# Patient Record
Sex: FEMALE | Race: WHITE | Employment: OTHER | URBAN - METROPOLITAN AREA
[De-identification: names, ages, dates, MRNs, and addresses within clinical notes are randomized per-mention and may not be internally consistent; named-entity substitution may affect disease eponyms.]

---

## 2023-03-28 ENCOUNTER — OFFICE VISIT (OUTPATIENT)
Dept: OBGYN CLINIC | Age: 78
End: 2023-03-28
Payer: MEDICARE

## 2023-03-28 VITALS
SYSTOLIC BLOOD PRESSURE: 120 MMHG | HEIGHT: 64 IN | WEIGHT: 141.6 LBS | BODY MASS INDEX: 24.17 KG/M2 | DIASTOLIC BLOOD PRESSURE: 80 MMHG

## 2023-03-28 DIAGNOSIS — N81.4 UTERINE PROLAPSE: Primary | ICD-10-CM

## 2023-03-28 DIAGNOSIS — Z00.00 MEDICARE ANNUAL WELLNESS VISIT, INITIAL: ICD-10-CM

## 2023-03-28 PROCEDURE — G8420 CALC BMI NORM PARAMETERS: HCPCS | Performed by: OBSTETRICS & GYNECOLOGY

## 2023-03-28 PROCEDURE — G0101 CA SCREEN;PELVIC/BREAST EXAM: HCPCS | Performed by: OBSTETRICS & GYNECOLOGY

## 2023-03-28 PROCEDURE — G8427 DOCREV CUR MEDS BY ELIG CLIN: HCPCS | Performed by: OBSTETRICS & GYNECOLOGY

## 2023-03-28 RX ORDER — MULTIVITAMIN
1 TABLET ORAL DAILY
COMMUNITY
Start: 2013-04-25

## 2023-03-28 RX ORDER — TRAZODONE HYDROCHLORIDE 50 MG/1
50 TABLET ORAL NIGHTLY
COMMUNITY
Start: 2023-03-21

## 2023-03-28 RX ORDER — FEXOFENADINE HCL 180 MG/1
180 TABLET ORAL DAILY PRN
COMMUNITY

## 2023-03-28 ASSESSMENT — PATIENT HEALTH QUESTIONNAIRE - PHQ9
SUM OF ALL RESPONSES TO PHQ9 QUESTIONS 1 & 2: 0
SUM OF ALL RESPONSES TO PHQ QUESTIONS 1-9: 0
SUM OF ALL RESPONSES TO PHQ QUESTIONS 1-9: 0
1. LITTLE INTEREST OR PLEASURE IN DOING THINGS: 0
SUM OF ALL RESPONSES TO PHQ QUESTIONS 1-9: 0
SUM OF ALL RESPONSES TO PHQ QUESTIONS 1-9: 0
2. FEELING DOWN, DEPRESSED OR HOPELESS: 0

## 2023-03-28 NOTE — PROGRESS NOTES
HPI:  Ms. Ceci Quesada is a 68 y.o. female  who is here today as a new patient for a well woman exam. She complains of uterine prolapse, she has known about this for many years. States that it has gotten worse. Feels like she can empty her bladder best in am and if she pushed her bladder back inside. She is occ sexually active. Date Performed Result   PAP  years    Mammogram 2021 wnl   Colonoscopy 4 years wnl   Dexa 10 years Wnl per pt        OB History          2    Para   2    Term   2            AB        Living   2         SAB        IAB        Ectopic        Molar        Multiple        Live Births   2            54 and 48   GYN History     No LMP recorded. Patient is postmenopausal.   Menopause 50's  - took HRT for a bit   No abnl pap hx. History reviewed. No pertinent past medical history. Hx of blood clots. Stopped hrt because of this. PE.  1 year of anticoagulant  Past Surgical History:   Procedure Laterality Date    CATARACT EXTRACTION      GALLBLADDER SURGERY      INGUINAL HERNIA REPAIR      Lap cholecystectomy and hernia. Allergies   Allergen Reactions    Aleve-D Sinus & Cold [Pseudoephedrine-Naproxen Na Er] Nausea And Vomiting and Other (See Comments)     \"Got red, sick at my stomach. I had to go to the emergency room\"       Current Outpatient Medications   Medication Sig Dispense Refill    traZODone (DESYREL) 50 MG tablet Take 50 mg by mouth nightly      Multiple Vitamin (MULTI-VITAMIN DAILY) TABS Take 1 tablet by mouth daily      fexofenadine (ALLEGRA) 180 MG tablet Take 180 mg by mouth daily as needed       No current facility-administered medications for this visit.        Current Outpatient Medications:     traZODone (DESYREL) 50 MG tablet, Take 50 mg by mouth nightly, Disp: , Rfl:     Multiple Vitamin (MULTI-VITAMIN DAILY) TABS, Take 1 tablet by mouth daily, Disp: , Rfl:     fexofenadine (ALLEGRA) 180 MG tablet, Take 180 mg by mouth daily as needed, Disp: ,

## 2023-04-27 ENCOUNTER — OFFICE VISIT (OUTPATIENT)
Dept: UROGYNECOLOGY | Age: 78
End: 2023-04-27
Payer: MEDICARE

## 2023-04-27 DIAGNOSIS — N81.3 UTEROVAGINAL PROLAPSE, COMPLETE: ICD-10-CM

## 2023-04-27 DIAGNOSIS — R39.89 URINARY PROBLEM: Primary | ICD-10-CM

## 2023-04-27 LAB
BILIRUBIN, URINE, POC: NEGATIVE
BLOOD URINE, POC: NEGATIVE
GLUCOSE URINE, POC: NEGATIVE
KETONES, URINE, POC: NEGATIVE
LEUKOCYTE ESTERASE, URINE, POC: NEGATIVE
NITRITE, URINE, POC: NEGATIVE
PH, URINE, POC: 6.5 (ref 4.6–8)
PROTEIN,URINE, POC: NEGATIVE
SPECIFIC GRAVITY, URINE, POC: 1.02 (ref 1–1.03)
URINALYSIS CLARITY, POC: CLEAR
URINALYSIS COLOR, POC: YELLOW
UROBILINOGEN, POC: NORMAL

## 2023-04-27 PROCEDURE — G8400 PT W/DXA NO RESULTS DOC: HCPCS | Performed by: OBSTETRICS & GYNECOLOGY

## 2023-04-27 PROCEDURE — G8420 CALC BMI NORM PARAMETERS: HCPCS | Performed by: OBSTETRICS & GYNECOLOGY

## 2023-04-27 PROCEDURE — 51701 INSERT BLADDER CATHETER: CPT | Performed by: OBSTETRICS & GYNECOLOGY

## 2023-04-27 PROCEDURE — G8427 DOCREV CUR MEDS BY ELIG CLIN: HCPCS | Performed by: OBSTETRICS & GYNECOLOGY

## 2023-04-27 PROCEDURE — 99204 OFFICE O/P NEW MOD 45 MIN: CPT | Performed by: OBSTETRICS & GYNECOLOGY

## 2023-04-27 PROCEDURE — 4004F PT TOBACCO SCREEN RCVD TLK: CPT | Performed by: OBSTETRICS & GYNECOLOGY

## 2023-04-27 PROCEDURE — 1090F PRES/ABSN URINE INCON ASSESS: CPT | Performed by: OBSTETRICS & GYNECOLOGY

## 2023-04-27 PROCEDURE — 1123F ACP DISCUSS/DSCN MKR DOCD: CPT | Performed by: OBSTETRICS & GYNECOLOGY

## 2023-04-27 PROCEDURE — 81003 URINALYSIS AUTO W/O SCOPE: CPT | Performed by: OBSTETRICS & GYNECOLOGY

## 2023-04-27 NOTE — ASSESSMENT & PLAN NOTE
We discussed the epidemiology, pathogenesis and etiology of pelvic organ prolapse. This is a chronic condition that has progressed. We discussed the potential risk factors which include genetics and environmental factors, such as childbirth, aging, menopause, straining, and previous surgery. I offered her management options which included nothing, pessary, and surgery. She is interested in: surgery    Decisions regarding major surgery were discussed today. I recommend performing urodynamics to evaluate the underlying bladder function as well as the cause of any urinary incontinence, either current or potential. We discussed how urodynamics are performed and the valuable information the procedure provides.

## 2023-05-30 ENCOUNTER — PROCEDURE VISIT (OUTPATIENT)
Dept: UROGYNECOLOGY | Age: 78
End: 2023-05-30
Payer: MEDICARE

## 2023-05-30 ENCOUNTER — OFFICE VISIT (OUTPATIENT)
Dept: UROGYNECOLOGY | Age: 78
End: 2023-05-30
Payer: MEDICARE

## 2023-05-30 DIAGNOSIS — N81.3 UTEROVAGINAL PROLAPSE, COMPLETE: ICD-10-CM

## 2023-05-30 DIAGNOSIS — R39.89 URINARY PROBLEM: Primary | ICD-10-CM

## 2023-05-30 PROCEDURE — 1090F PRES/ABSN URINE INCON ASSESS: CPT | Performed by: OBSTETRICS & GYNECOLOGY

## 2023-05-30 PROCEDURE — 51741 ELECTRO-UROFLOWMETRY FIRST: CPT | Performed by: OBSTETRICS & GYNECOLOGY

## 2023-05-30 PROCEDURE — 99215 OFFICE O/P EST HI 40 MIN: CPT | Performed by: OBSTETRICS & GYNECOLOGY

## 2023-05-30 PROCEDURE — 51729 CYSTOMETROGRAM W/VP&UP: CPT | Performed by: OBSTETRICS & GYNECOLOGY

## 2023-05-30 PROCEDURE — G8400 PT W/DXA NO RESULTS DOC: HCPCS | Performed by: OBSTETRICS & GYNECOLOGY

## 2023-05-30 PROCEDURE — G8420 CALC BMI NORM PARAMETERS: HCPCS | Performed by: OBSTETRICS & GYNECOLOGY

## 2023-05-30 PROCEDURE — G8428 CUR MEDS NOT DOCUMENT: HCPCS | Performed by: OBSTETRICS & GYNECOLOGY

## 2023-05-30 PROCEDURE — 51784 ANAL/URINARY MUSCLE STUDY: CPT | Performed by: OBSTETRICS & GYNECOLOGY

## 2023-05-30 PROCEDURE — 4004F PT TOBACCO SCREEN RCVD TLK: CPT | Performed by: OBSTETRICS & GYNECOLOGY

## 2023-05-30 PROCEDURE — 51797 INTRAABDOMINAL PRESSURE TEST: CPT | Performed by: OBSTETRICS & GYNECOLOGY

## 2023-05-30 PROCEDURE — 1123F ACP DISCUSS/DSCN MKR DOCD: CPT | Performed by: OBSTETRICS & GYNECOLOGY

## 2023-05-30 NOTE — PROGRESS NOTES
[facility] UROGYNECOLOGY  [address]  Dept: [phone]        PCP:  Narda Sin MD    5/30/2023        HPI:  Cedric Sandoval is here to discuss testing results and surgical options. She has read through the material previously given to her explaining her surgical options for her chronic condition that has shown continued progression over time. I have reviewed her initial examination and POP-Q. I have reviewed her urodynamic testing from today. No results found for this visit on 05/30/23. There were no vitals taken for this visit. We have reviewed her POP-q exam together. We have reviewed her urodynamic study results together. Pelvic Organ Prolapse Quantification 4/27/2023   Anterior Wall (Aa) 1   Anterior Wall (Ba) 1   Cervix or Cuff (C) 0   Genital Haitus (gh) 2.5   Perineal Body (pb) 3   Total Vaginal Length (tvl) 9   Posterior Wall (Ap) -2   Posterior Wall (Bp) -2   Posterior Fornix (D) -7          1. Uterovaginal prolapse, complete  Assessment & Plan:  Robotic Assisted Laparoscopic Sacrocolpopexy    We discussed the epidemiology, pathogenesis and etiology of pelvic organ prolapse. We discussed the potential risk factors which include genetics and environmental factors, such as childbirth, aging, menopause, straining, and previous surgery. I offered her management options which included nothing, pessary, and surgery. We discussed her options of correcting the prolapse through a vaginal approach and laparoscopic approach. We also discussed repairing the vaginal prolapse with mesh and the option to do this without mesh. I described the surgical technique to be employed for all options of  her upcoming surgery. We discussed the anticipated postoperative course. She would like to talk it over with her . She is thinking about a surgery in the fall. No follow-up provider specified.     On this date 5/30/2023 I have

## 2023-05-30 NOTE — ASSESSMENT & PLAN NOTE
Robotic Assisted Laparoscopic Sacrocolpopexy    We discussed the epidemiology, pathogenesis and etiology of pelvic organ prolapse. We discussed the potential risk factors which include genetics and environmental factors, such as childbirth, aging, menopause, straining, and previous surgery. I offered her management options which included nothing, pessary, and surgery. We discussed her options of correcting the prolapse through a vaginal approach and laparoscopic approach. We also discussed repairing the vaginal prolapse with mesh and the option to do this without mesh. I described the surgical technique to be employed for all options of  her upcoming surgery. We discussed the anticipated postoperative course. She would like to talk it over with her . She is thinking about a surgery in the fall.

## 2023-05-30 NOTE — PROGRESS NOTES
5/30/2023      Current Outpatient Medications:     traZODone (DESYREL) 50 MG tablet, Take 1 tablet by mouth nightly, Disp: , Rfl:     Multiple Vitamin (MULTI-VITAMIN DAILY) TABS, Take 1 tablet by mouth daily, Disp: , Rfl:     fexofenadine (ALLEGRA) 180 MG tablet, Take 1 tablet by mouth daily as needed, Disp: , Rfl:     Allergies   Allergen Reactions    Aleve-D Sinus & Cold [Pseudoephedrine-Naproxen Na Er] Nausea And Vomiting and Other (See Comments)     \"Got red, sick at my stomach. I had to go to the emergency room\"       Past Medical History:   Diagnosis Date    Clotting disorder ([facility] [address] 2007    Blood clots in lungs. Treated at [facility] and took blood thinner for one year. Infertility, female        There were no vitals taken for this visit. No results found for this visit on 05/30/23. Indications:    Patient desires surgical intervention for prolapse and stress incontinence. Informed Consent: The nature of urodynamic testing and its purpose were discussed with the patient. Alternative testing (or no testing) was reviewed. Risks include, but are not limited to infection, urethral trauma and bleeding, bladder injury and perforation, and the need for further testing based on the results. No guarantees of success or outcome were given or implied. The patient stated there were no further questions and agreed to proceed. Verbal consent was obtained. Anesthesia: None    UROFLOW:   The patient underwent a uroflow which demonstrated:     Initial Urine Volume: 245 cc   Peak Flow Rate:22.3 ml/s   Flow Time: 31.4 mm:ss. S   Average flow rate: 7.8 ml/s    After voiding, the patient was placed in dorsolithotomy position and two EMG electrodes were palced at the 2 and 10 o'clock positions in relation to the rectum. A third electrode was placed on the lateral thigh.  The urethral was prepped in sterile fashion and the bladder was catheterized for a residual urine with the following results:     PVR:

## 2023-07-26 ENCOUNTER — OFFICE VISIT (OUTPATIENT)
Dept: UROGYNECOLOGY | Age: 78
End: 2023-07-26
Payer: MEDICARE

## 2023-07-26 DIAGNOSIS — N81.3 UTEROVAGINAL PROLAPSE, COMPLETE: ICD-10-CM

## 2023-07-26 PROCEDURE — G8420 CALC BMI NORM PARAMETERS: HCPCS | Performed by: OBSTETRICS & GYNECOLOGY

## 2023-07-26 PROCEDURE — 1090F PRES/ABSN URINE INCON ASSESS: CPT | Performed by: OBSTETRICS & GYNECOLOGY

## 2023-07-26 PROCEDURE — G8428 CUR MEDS NOT DOCUMENT: HCPCS | Performed by: OBSTETRICS & GYNECOLOGY

## 2023-07-26 PROCEDURE — 1123F ACP DISCUSS/DSCN MKR DOCD: CPT | Performed by: OBSTETRICS & GYNECOLOGY

## 2023-07-26 PROCEDURE — 99215 OFFICE O/P EST HI 40 MIN: CPT | Performed by: OBSTETRICS & GYNECOLOGY

## 2023-07-26 PROCEDURE — 4004F PT TOBACCO SCREEN RCVD TLK: CPT | Performed by: OBSTETRICS & GYNECOLOGY

## 2023-07-26 PROCEDURE — G8400 PT W/DXA NO RESULTS DOC: HCPCS | Performed by: OBSTETRICS & GYNECOLOGY

## 2023-07-26 NOTE — PROGRESS NOTES
[facility] UROGYNECOLOGY  [address] [facility]  Dept: [phone]        PCP:  Falguni Torres MD    7/26/2023        HPI:  Ana Rosa Boswell is here to discuss testing results and surgical options. She has read through the material previously given to her explaining her surgical options for her chronic condition that has shown continued progression over time. I have reviewed her initial examination and POP-Q. I have reviewed her urodynamic testing. No results found for this visit on 07/26/23. There were no vitals taken for this visit. We have reviewed her POP-q exam together. We have reviewed her urodynamic study results together. Pelvic Organ Prolapse Quantification 4/27/2023   Anterior Wall (Aa) 1   Anterior Wall (Ba) 1   Cervix or Cuff (C) 0   Genital Haitus (gh) 2.5   Perineal Body (pb) 3   Total Vaginal Length (tvl) 9   Posterior Wall (Ap) -2   Posterior Wall (Bp) -2   Posterior Fornix (D) -7          1. Uterovaginal prolapse, complete  Assessment & Plan: We re-reviewed her exam and options. She still desires a sacrocolpopexy:     Robotic Assisted Laparoscopic Sacrocolpopexy    We discussed the epidemiology, pathogenesis and etiology of pelvic organ prolapse. We discussed the potential risk factors which include genetics and environmental factors, such as childbirth, aging, menopause, straining, and previous surgery. I offered her management options which included nothing, pessary, and surgery. We discussed her options of correcting the prolapse through a vaginal approach and laparoscopic approach. We also discussed repairing the vaginal prolapse with mesh and the option to do this without mesh. She has decided she would like to have a Robotic assisted laparoscopic supracervical hysterectomy with removal of both fallopian tubes and ovaries, prolapse repair (sacrocolpopexy) with Y mesh and camera in the bladder (cystoscopy).  Possible vaginal

## 2023-07-26 NOTE — ASSESSMENT & PLAN NOTE
We re-reviewed her exam and options. She still desires a sacrocolpopexy:     Robotic Assisted Laparoscopic Sacrocolpopexy    We discussed the epidemiology, pathogenesis and etiology of pelvic organ prolapse. We discussed the potential risk factors which include genetics and environmental factors, such as childbirth, aging, menopause, straining, and previous surgery. I offered her management options which included nothing, pessary, and surgery. We discussed her options of correcting the prolapse through a vaginal approach and laparoscopic approach. We also discussed repairing the vaginal prolapse with mesh and the option to do this without mesh. She has decided she would like to have a Robotic assisted laparoscopic supracervical hysterectomy with removal of both fallopian tubes and ovaries, prolapse repair (sacrocolpopexy) with Y mesh and camera in the bladder (cystoscopy). Possible vaginal prolapse repair. I described the surgical technique to be employed for her upcoming surgery. We discussed the anticipated postoperative course. We discussed using the [address] [employer] robot for assisting in the surgical procedure. I explained the function and purpose of the robot which greatly enhances my ability to perform the reconstruction. We discussed that the care may not be able to be completed laparoscopically and that a laparotomy may become necessary. There is a >80% success rate. We discussed the cause of uterine prolapse. We discussed that the uterus itself is usually normal. We discussed the options of prolapse repair with hysterectomy or prolapse repair with uterine suspension. We discussed the risk of uterine cancer. We discussed that some patients do fail, although not all require another surgery. We discussed the risks of repair which include pain, dysparunia, bladder and/or bowel dysfunction and sexual dysfunction. We discussed the properties of polypropylene mesh.  We discussed the inert nature and the

## 2023-09-12 ENCOUNTER — PREP FOR PROCEDURE (OUTPATIENT)
Dept: UROGYNECOLOGY | Age: 78
End: 2023-09-12

## 2023-09-29 NOTE — H&P
History and Physical    Patient: Kika Medrano MRN: [id number]  SSN: [id number]   YOB: 1945  Age: 68 y.o. Sex: female       Chief Complaint:  Pelvic Organ Prolapse    History of Present Illness:  Kika Medrano is a 68 y.o. female with POP. Ms. Lupe Garza has been experiencing prolapse for 10 years. The prolapse does cause her pressure. She sometime have to splint to complete urination, a BM, or for comfort. Physical activity makes her prolapse symptoms worse. Relaxing/ Laying down makes her prolapse symptoms better. She has not tried a pessary, she has not tried physical therapy, she has not  had surgery for her POP. Ms. Lupe Garza  has been leaking urine for 2 years. She leaks urine daytime. She does not leak urine with cough, laugh, sneeze and activity. She does leak urine with urgency. She uses medium  pads outside the house and goes through 1. She leaks 1 times per day. When she leaks she leaks small amounts. She has not tried PT, she has not tried medication . She has not had procedures/surgery for this in the past.     With both urge incontinence and stress incontinence, Urge incontinence bothers her the most.     She voids 7-8 times during the day. She voids 2 times over night. She has 7 BM per week, and sometimes strain. She drinks 3 caffeine drinks beverages per day. She uses 0 artificial sweeteners per day. She drinks 0 alcoholic beverages per week. She has not pelvic surgery in the past. Hernia surgery 10 years ago  Her last PAP: 10 years ago per patient   Her last Colonoscopy: 3 years per patient  Her last Mammogram: 2022 per patient      Allergies: Allergies   Allergen Reactions    Aleve-D Sinus & Cold [Pseudoephedrine-Naproxen Na Er] Nausea And Vomiting and Other (See Comments)     \"Got red, sick at my stomach. I had to go to the emergency room\"         Medications:  No current facility-administered medications for this encounter.     Current Outpatient

## 2023-10-06 NOTE — PERIOP NOTE
Patient verified name and . Order for consent found in EHR and matches case posting; patient verifies procedure. Type 3 surgery, phone assessment complete. Orders received. Labs per surgeon: None  Labs per anesthesia protocol: cbc, bmp, T&S; all to be completed DOS-pt lives in [place]. Orders signed and held in EHR. Patient answered medical/surgical history questions at their best of ability. All prior to admission medications documented in EPIC. Patient instructed to take the following medications the day of surgery according to anesthesia guidelines with a small sip of water: None. Hold all vitamins, supplements, herbals, and NSAIDS starting now. Patient instructed on the following:    > Arrive at [address] [place] [address], time of arrival to be called the day before by 1700  > NPO after midnight, unless otherwise indicated, including gum, mints, and ice chips  > Responsible adult must drive patient to the hospital, stay during surgery, and patient will need supervision 24 hours after anesthesia  > Use anti-bacterial soap and soap provided by 's office in shower the night before surgery and on the morning of surgery  > All piercings must be removed prior to arrival.    > Leave all valuables (money and jewelry) at home but bring insurance card and ID on DOS.   > Do not wear make-up, nail polish, lotions, cologne, perfumes, powders, or oil on skin. Artificial nails are not permitted.

## 2023-10-09 ENCOUNTER — ANESTHESIA EVENT (OUTPATIENT)
Dept: SURGERY | Age: 78
End: 2023-10-09
Payer: MEDICARE

## 2023-10-10 ENCOUNTER — ANESTHESIA (OUTPATIENT)
Dept: SURGERY | Age: 78
End: 2023-10-10
Payer: MEDICARE

## 2023-10-10 ENCOUNTER — HOSPITAL ENCOUNTER (OUTPATIENT)
Age: 78
Setting detail: OUTPATIENT SURGERY
Discharge: HOME | End: 2023-10-10
Attending: OBSTETRICS & GYNECOLOGY | Admitting: OBSTETRICS & GYNECOLOGY
Payer: MEDICARE

## 2023-10-10 VITALS
SYSTOLIC BLOOD PRESSURE: 130 MMHG | HEART RATE: 65 BPM | HEIGHT: 64 IN | WEIGHT: 141.5 LBS | DIASTOLIC BLOOD PRESSURE: 78 MMHG | BODY MASS INDEX: 24.16 KG/M2 | TEMPERATURE: 97.3 F | OXYGEN SATURATION: 98 % | RESPIRATION RATE: 18 BRPM

## 2023-10-10 DIAGNOSIS — N81.3 UTEROVAGINAL PROLAPSE, COMPLETE: ICD-10-CM

## 2023-10-10 DIAGNOSIS — G89.18 POST-OP PAIN: Primary | ICD-10-CM

## 2023-10-10 LAB
ABO + RH BLD: NORMAL
ANION GAP SERPL CALC-SCNC: 8 MMOL/L (ref 2–11)
BLOOD GROUP ANTIBODIES SERPL: NORMAL
BUN SERPL-MCNC: 13 MG/DL (ref 8–23)
CALCIUM SERPL-MCNC: 9.3 MG/DL (ref 8.3–10.4)
CHLORIDE SERPL-SCNC: 108 MMOL/L (ref 101–110)
CO2 SERPL-SCNC: 25 MMOL/L (ref 21–32)
CREAT SERPL-MCNC: 0.71 MG/DL (ref 0.6–1)
ERYTHROCYTE [DISTWIDTH] IN BLOOD BY AUTOMATED COUNT: 13.2 % (ref 11.9–14.6)
GLUCOSE SERPL-MCNC: 97 MG/DL (ref 65–100)
HCT VFR BLD AUTO: 42 % (ref 35.8–46.3)
HGB BLD-MCNC: 14 G/DL (ref 11.7–15.4)
MCH RBC QN AUTO: 32.1 PG (ref 26.1–32.9)
MCHC RBC AUTO-ENTMCNC: 33.3 G/DL (ref 31.4–35)
MCV RBC AUTO: 96.3 FL (ref 82–102)
NRBC # BLD: 0 K/UL (ref 0–0.2)
PLATELET # BLD AUTO: 274 K/UL (ref 150–450)
PMV BLD AUTO: 9.6 FL (ref 9.4–12.3)
POTASSIUM SERPL-SCNC: 4 MMOL/L (ref 3.5–5.1)
RBC # BLD AUTO: 4.36 M/UL (ref 4.05–5.2)
SODIUM SERPL-SCNC: 141 MMOL/L (ref 133–143)
SPECIMEN EXP DATE BLD: NORMAL
WBC # BLD AUTO: 4.9 K/UL (ref 4.3–11.1)

## 2023-10-10 PROCEDURE — 2500000003 HC RX 250 WO HCPCS: Performed by: NURSE ANESTHETIST, CERTIFIED REGISTERED

## 2023-10-10 PROCEDURE — 6370000000 HC RX 637 (ALT 250 FOR IP): Performed by: ANESTHESIOLOGY

## 2023-10-10 PROCEDURE — 7100000011 HC PHASE II RECOVERY - ADDTL 15 MIN: Performed by: OBSTETRICS & GYNECOLOGY

## 2023-10-10 PROCEDURE — 2580000003 HC RX 258: Performed by: ANESTHESIOLOGY

## 2023-10-10 PROCEDURE — 6360000002 HC RX W HCPCS: Performed by: ANESTHESIOLOGY

## 2023-10-10 PROCEDURE — 7100000010 HC PHASE II RECOVERY - FIRST 15 MIN: Performed by: OBSTETRICS & GYNECOLOGY

## 2023-10-10 PROCEDURE — 58541 LSH UTERUS 250 G OR LESS: CPT | Performed by: OBSTETRICS & GYNECOLOGY

## 2023-10-10 PROCEDURE — 85027 COMPLETE CBC AUTOMATED: CPT

## 2023-10-10 PROCEDURE — 86900 BLOOD TYPING SEROLOGIC ABO: CPT

## 2023-10-10 PROCEDURE — 3700000001 HC ADD 15 MINUTES (ANESTHESIA): Performed by: OBSTETRICS & GYNECOLOGY

## 2023-10-10 PROCEDURE — 3600000009 HC SURGERY ROBOT BASE: Performed by: OBSTETRICS & GYNECOLOGY

## 2023-10-10 PROCEDURE — 88307 TISSUE EXAM BY PATHOLOGIST: CPT

## 2023-10-10 PROCEDURE — 86850 RBC ANTIBODY SCREEN: CPT

## 2023-10-10 PROCEDURE — S2900 ROBOTIC SURGICAL SYSTEM: HCPCS | Performed by: OBSTETRICS & GYNECOLOGY

## 2023-10-10 PROCEDURE — 6360000002 HC RX W HCPCS: Performed by: OBSTETRICS & GYNECOLOGY

## 2023-10-10 PROCEDURE — 7100000000 HC PACU RECOVERY - FIRST 15 MIN: Performed by: OBSTETRICS & GYNECOLOGY

## 2023-10-10 PROCEDURE — 57425 LAPAROSCOPY SURG COLPOPEXY: CPT | Performed by: OBSTETRICS & GYNECOLOGY

## 2023-10-10 PROCEDURE — 3700000000 HC ANESTHESIA ATTENDED CARE: Performed by: OBSTETRICS & GYNECOLOGY

## 2023-10-10 PROCEDURE — 7100000001 HC PACU RECOVERY - ADDTL 15 MIN: Performed by: OBSTETRICS & GYNECOLOGY

## 2023-10-10 PROCEDURE — C1781 MESH (IMPLANTABLE): HCPCS | Performed by: OBSTETRICS & GYNECOLOGY

## 2023-10-10 PROCEDURE — 80048 BASIC METABOLIC PNL TOTAL CA: CPT

## 2023-10-10 PROCEDURE — 3600000019 HC SURGERY ROBOT ADDTL 15MIN: Performed by: OBSTETRICS & GYNECOLOGY

## 2023-10-10 PROCEDURE — 6360000002 HC RX W HCPCS: Performed by: NURSE ANESTHETIST, CERTIFIED REGISTERED

## 2023-10-10 PROCEDURE — 86901 BLOOD TYPING SEROLOGIC RH(D): CPT

## 2023-10-10 PROCEDURE — 2709999900 HC NON-CHARGEABLE SUPPLY: Performed by: OBSTETRICS & GYNECOLOGY

## 2023-10-10 PROCEDURE — 6370000000 HC RX 637 (ALT 250 FOR IP): Performed by: OBSTETRICS & GYNECOLOGY

## 2023-10-10 DEVICE — POLYPROPYLENE MESH FOR SACROCOLPOSUSPENSION/SACROCOLPOPEXY - Y CONTOUR™
Type: IMPLANTABLE DEVICE | Site: PELVIS | Status: FUNCTIONAL
Brand: RESTORELLE

## 2023-10-10 RX ORDER — PHENAZOPYRIDINE HYDROCHLORIDE 95 MG/1
95 TABLET ORAL ONCE
Status: COMPLETED | OUTPATIENT
Start: 2023-10-10 | End: 2023-10-10

## 2023-10-10 RX ORDER — SODIUM CHLORIDE 9 MG/ML
INJECTION, SOLUTION INTRAVENOUS PRN
Status: DISCONTINUED | OUTPATIENT
Start: 2023-10-10 | End: 2023-10-10 | Stop reason: HOSPADM

## 2023-10-10 RX ORDER — OXYCODONE HYDROCHLORIDE 5 MG/1
5 TABLET ORAL PRN
Status: COMPLETED | OUTPATIENT
Start: 2023-10-10 | End: 2023-10-10

## 2023-10-10 RX ORDER — SODIUM CHLORIDE 0.9 % (FLUSH) 0.9 %
5-40 SYRINGE (ML) INJECTION EVERY 12 HOURS SCHEDULED
Status: DISCONTINUED | OUTPATIENT
Start: 2023-10-10 | End: 2023-10-10 | Stop reason: HOSPADM

## 2023-10-10 RX ORDER — SODIUM CHLORIDE 0.9 % (FLUSH) 0.9 %
5-40 SYRINGE (ML) INJECTION PRN
Status: DISCONTINUED | OUTPATIENT
Start: 2023-10-10 | End: 2023-10-10 | Stop reason: HOSPADM

## 2023-10-10 RX ORDER — LIDOCAINE HYDROCHLORIDE 10 MG/ML
1 INJECTION, SOLUTION INFILTRATION; PERINEURAL
Status: DISCONTINUED | OUTPATIENT
Start: 2023-10-10 | End: 2023-10-10 | Stop reason: HOSPADM

## 2023-10-10 RX ORDER — NEOSTIGMINE METHYLSULFATE 1 MG/ML
INJECTION, SOLUTION INTRAVENOUS PRN
Status: DISCONTINUED | OUTPATIENT
Start: 2023-10-10 | End: 2023-10-10 | Stop reason: SDUPTHER

## 2023-10-10 RX ORDER — ONDANSETRON 2 MG/ML
4 INJECTION INTRAMUSCULAR; INTRAVENOUS
Status: DISCONTINUED | OUTPATIENT
Start: 2023-10-10 | End: 2023-10-10 | Stop reason: HOSPADM

## 2023-10-10 RX ORDER — APREPITANT 40 MG/1
40 CAPSULE ORAL ONCE
Status: COMPLETED | OUTPATIENT
Start: 2023-10-10 | End: 2023-10-10

## 2023-10-10 RX ORDER — OXYCODONE HYDROCHLORIDE 5 MG/1
10 TABLET ORAL PRN
Status: COMPLETED | OUTPATIENT
Start: 2023-10-10 | End: 2023-10-10

## 2023-10-10 RX ORDER — SODIUM CHLORIDE 0.9 % (FLUSH) 0.9 %
5-40 SYRINGE (ML) INJECTION EVERY 12 HOURS SCHEDULED
Status: DISCONTINUED | OUTPATIENT
Start: 2023-10-10 | End: 2023-10-10 | Stop reason: SDUPTHER

## 2023-10-10 RX ORDER — DEXAMETHASONE SODIUM PHOSPHATE 10 MG/ML
INJECTION INTRAMUSCULAR; INTRAVENOUS PRN
Status: DISCONTINUED | OUTPATIENT
Start: 2023-10-10 | End: 2023-10-10 | Stop reason: SDUPTHER

## 2023-10-10 RX ORDER — ACETAMINOPHEN 500 MG
1000 TABLET ORAL ONCE
Status: COMPLETED | OUTPATIENT
Start: 2023-10-10 | End: 2023-10-10

## 2023-10-10 RX ORDER — BUPIVACAINE HYDROCHLORIDE 2.5 MG/ML
INJECTION, SOLUTION EPIDURAL; INFILTRATION; INTRACAUDAL PRN
Status: DISCONTINUED | OUTPATIENT
Start: 2023-10-10 | End: 2023-10-10 | Stop reason: ALTCHOICE

## 2023-10-10 RX ORDER — SODIUM CHLORIDE 0.9 % (FLUSH) 0.9 %
5-40 SYRINGE (ML) INJECTION PRN
Status: DISCONTINUED | OUTPATIENT
Start: 2023-10-10 | End: 2023-10-10 | Stop reason: SDUPTHER

## 2023-10-10 RX ORDER — HYDROMORPHONE HYDROCHLORIDE 2 MG/1
1 TABLET ORAL EVERY 6 HOURS PRN
Qty: 10 TABLET | Refills: 0 | Status: SHIPPED | OUTPATIENT
Start: 2023-10-10 | End: 2023-10-13

## 2023-10-10 RX ORDER — LIDOCAINE HYDROCHLORIDE 20 MG/ML
INJECTION, SOLUTION EPIDURAL; INFILTRATION; INTRACAUDAL; PERINEURAL PRN
Status: DISCONTINUED | OUTPATIENT
Start: 2023-10-10 | End: 2023-10-10 | Stop reason: SDUPTHER

## 2023-10-10 RX ORDER — SODIUM CHLORIDE, SODIUM LACTATE, POTASSIUM CHLORIDE, CALCIUM CHLORIDE 600; 310; 30; 20 MG/100ML; MG/100ML; MG/100ML; MG/100ML
INJECTION, SOLUTION INTRAVENOUS CONTINUOUS
Status: DISCONTINUED | OUTPATIENT
Start: 2023-10-10 | End: 2023-10-10 | Stop reason: HOSPADM

## 2023-10-10 RX ORDER — PROPOFOL 10 MG/ML
INJECTION, EMULSION INTRAVENOUS PRN
Status: DISCONTINUED | OUTPATIENT
Start: 2023-10-10 | End: 2023-10-10 | Stop reason: SDUPTHER

## 2023-10-10 RX ORDER — GLYCOPYRROLATE 0.2 MG/ML
INJECTION INTRAMUSCULAR; INTRAVENOUS PRN
Status: DISCONTINUED | OUTPATIENT
Start: 2023-10-10 | End: 2023-10-10 | Stop reason: SDUPTHER

## 2023-10-10 RX ORDER — ROCURONIUM BROMIDE 10 MG/ML
INJECTION, SOLUTION INTRAVENOUS PRN
Status: DISCONTINUED | OUTPATIENT
Start: 2023-10-10 | End: 2023-10-10 | Stop reason: SDUPTHER

## 2023-10-10 RX ORDER — EPHEDRINE SULFATE/0.9% NACL/PF 50 MG/5 ML
SYRINGE (ML) INTRAVENOUS PRN
Status: DISCONTINUED | OUTPATIENT
Start: 2023-10-10 | End: 2023-10-10 | Stop reason: SDUPTHER

## 2023-10-10 RX ORDER — FENTANYL CITRATE 50 UG/ML
INJECTION, SOLUTION INTRAMUSCULAR; INTRAVENOUS PRN
Status: DISCONTINUED | OUTPATIENT
Start: 2023-10-10 | End: 2023-10-10 | Stop reason: SDUPTHER

## 2023-10-10 RX ORDER — MIDAZOLAM HYDROCHLORIDE 2 MG/2ML
2 INJECTION, SOLUTION INTRAMUSCULAR; INTRAVENOUS
Status: DISCONTINUED | OUTPATIENT
Start: 2023-10-10 | End: 2023-10-10 | Stop reason: HOSPADM

## 2023-10-10 RX ORDER — PROCHLORPERAZINE EDISYLATE 5 MG/ML
5 INJECTION INTRAMUSCULAR; INTRAVENOUS
Status: DISCONTINUED | OUTPATIENT
Start: 2023-10-10 | End: 2023-10-10 | Stop reason: HOSPADM

## 2023-10-10 RX ORDER — ONDANSETRON 2 MG/ML
INJECTION INTRAMUSCULAR; INTRAVENOUS PRN
Status: DISCONTINUED | OUTPATIENT
Start: 2023-10-10 | End: 2023-10-10 | Stop reason: SDUPTHER

## 2023-10-10 RX ORDER — KETAMINE HYDROCHLORIDE 50 MG/ML
INJECTION, SOLUTION, CONCENTRATE INTRAMUSCULAR; INTRAVENOUS PRN
Status: DISCONTINUED | OUTPATIENT
Start: 2023-10-10 | End: 2023-10-10 | Stop reason: SDUPTHER

## 2023-10-10 RX ORDER — ONDANSETRON 4 MG/1
4 TABLET, ORALLY DISINTEGRATING ORAL 3 TIMES DAILY PRN
Qty: 10 TABLET | Refills: 0 | Status: SHIPPED | OUTPATIENT
Start: 2023-10-10

## 2023-10-10 RX ORDER — DIPHENHYDRAMINE HYDROCHLORIDE 50 MG/ML
12.5 INJECTION INTRAMUSCULAR; INTRAVENOUS
Status: DISCONTINUED | OUTPATIENT
Start: 2023-10-10 | End: 2023-10-10 | Stop reason: HOSPADM

## 2023-10-10 RX ORDER — SODIUM CHLORIDE 9 MG/ML
INJECTION, SOLUTION INTRAVENOUS PRN
Status: DISCONTINUED | OUTPATIENT
Start: 2023-10-10 | End: 2023-10-10 | Stop reason: SDUPTHER

## 2023-10-10 RX ADMIN — APREPITANT 40 MG: 40 CAPSULE ORAL at 09:22

## 2023-10-10 RX ADMIN — PROPOFOL 30 MG: 10 INJECTION, EMULSION INTRAVENOUS at 12:25

## 2023-10-10 RX ADMIN — GLYCOPYRROLATE 0.6 MG: 0.2 INJECTION INTRAMUSCULAR; INTRAVENOUS at 14:08

## 2023-10-10 RX ADMIN — ACETAMINOPHEN 1000 MG: 500 TABLET, FILM COATED ORAL at 09:22

## 2023-10-10 RX ADMIN — FENTANYL CITRATE 50 MCG: 50 INJECTION, SOLUTION INTRAMUSCULAR; INTRAVENOUS at 12:19

## 2023-10-10 RX ADMIN — Medication 10 MG: at 11:42

## 2023-10-10 RX ADMIN — KETAMINE HYDROCHLORIDE 10 MG: 50 INJECTION, SOLUTION INTRAMUSCULAR; INTRAVENOUS at 12:35

## 2023-10-10 RX ADMIN — OXYCODONE HYDROCHLORIDE 5 MG: 5 TABLET ORAL at 15:14

## 2023-10-10 RX ADMIN — SODIUM CHLORIDE, SODIUM LACTATE, POTASSIUM CHLORIDE, AND CALCIUM CHLORIDE: 600; 310; 30; 20 INJECTION, SOLUTION INTRAVENOUS at 10:33

## 2023-10-10 RX ADMIN — Medication 10 MG: at 14:01

## 2023-10-10 RX ADMIN — URINARY PAIN RELIEF 95 MG: 95 TABLET ORAL at 10:33

## 2023-10-10 RX ADMIN — FENTANYL CITRATE 50 MCG: 50 INJECTION, SOLUTION INTRAMUSCULAR; INTRAVENOUS at 12:02

## 2023-10-10 RX ADMIN — SODIUM CHLORIDE, SODIUM LACTATE, POTASSIUM CHLORIDE, AND CALCIUM CHLORIDE: 600; 310; 30; 20 INJECTION, SOLUTION INTRAVENOUS at 12:23

## 2023-10-10 RX ADMIN — Medication 10 MG: at 12:13

## 2023-10-10 RX ADMIN — PROPOFOL 130 MG: 10 INJECTION, EMULSION INTRAVENOUS at 11:42

## 2023-10-10 RX ADMIN — ROCURONIUM BROMIDE 40 MG: 50 INJECTION, SOLUTION INTRAVENOUS at 11:42

## 2023-10-10 RX ADMIN — PHENYLEPHRINE HYDROCHLORIDE 100 MCG: 0.1 INJECTION, SOLUTION INTRAVENOUS at 13:12

## 2023-10-10 RX ADMIN — DEXAMETHASONE SODIUM PHOSPHATE 10 MG: 10 INJECTION INTRAMUSCULAR; INTRAVENOUS at 12:02

## 2023-10-10 RX ADMIN — PHENYLEPHRINE HYDROCHLORIDE 100 MCG: 0.1 INJECTION, SOLUTION INTRAVENOUS at 12:58

## 2023-10-10 RX ADMIN — Medication 3 MG: at 14:08

## 2023-10-10 RX ADMIN — GLYCOPYRROLATE 0.2 MG: 0.2 INJECTION INTRAMUSCULAR; INTRAVENOUS at 12:17

## 2023-10-10 RX ADMIN — PHENYLEPHRINE HYDROCHLORIDE 100 MCG: 0.1 INJECTION, SOLUTION INTRAVENOUS at 13:16

## 2023-10-10 RX ADMIN — ONDANSETRON 4 MG: 2 INJECTION INTRAMUSCULAR; INTRAVENOUS at 12:38

## 2023-10-10 RX ADMIN — PROPOFOL 20 MG: 10 INJECTION, EMULSION INTRAVENOUS at 14:06

## 2023-10-10 RX ADMIN — DEXAMETHASONE SODIUM PHOSPHATE 5 MG: 10 INJECTION INTRAMUSCULAR; INTRAVENOUS at 12:38

## 2023-10-10 RX ADMIN — KETAMINE HYDROCHLORIDE 20 MG: 50 INJECTION, SOLUTION INTRAMUSCULAR; INTRAVENOUS at 11:42

## 2023-10-10 RX ADMIN — Medication 10 MG: at 11:56

## 2023-10-10 RX ADMIN — PHENYLEPHRINE HYDROCHLORIDE 100 MCG: 0.1 INJECTION, SOLUTION INTRAVENOUS at 13:52

## 2023-10-10 RX ADMIN — Medication 10 MG: at 13:34

## 2023-10-10 RX ADMIN — Medication 10 MG: at 11:58

## 2023-10-10 RX ADMIN — LIDOCAINE HYDROCHLORIDE 100 MG: 20 INJECTION, SOLUTION EPIDURAL; INFILTRATION; INTRACAUDAL; PERINEURAL at 11:42

## 2023-10-10 ASSESSMENT — PAIN SCALES - GENERAL: PAINLEVEL_OUTOF10: 6

## 2023-10-10 ASSESSMENT — PAIN - FUNCTIONAL ASSESSMENT: PAIN_FUNCTIONAL_ASSESSMENT: 0-10

## 2023-10-10 NOTE — PERIOP NOTE
The gilmore bag was removed from the indwelling catheter and 300 cc of sterile water was instilled into the bladder. The gilmore catheter was then removed. The patient was asked to void. She was able to void 275 cc at  1612 pm.      Patient eating, drinking, and ambulating without difficulty at this time. The patient was discharged without a gilmore catheter.

## 2023-10-10 NOTE — OP NOTE
PROCEDURES PERFORMED:  1. Supracervical hysterectomy and bilateral salpingoopherectomy. 2. Robotic-assisted laparoscopic sacral colpopexy with Restorelle Y mesh. 3. Cystourethroscopy. PREOPERATIVE DIAGNOSES:  1. Anterior Wall Prolapse  2. Uterovaginal Prolapse    POSTOPERATIVE DIAGNOSES:  1. Anterior Wall Prolapse  2. Uterovaginal Prolapse    SPECIMEN: Uterus, bilateral fallopian tubes and ovaries      Derek West MD    EBL: 30cc    IVF: 1800cc    Urine Output: 400cc    INTRAOPERATIVE FINDINGS: Laparoscopic inspection revealed normal-appearing tubes with an enlarged left ovary and normal appearing right ovary. normal-appearing uterus. She has evidence of diverticulosis and adhesions from her colon to the pelvic side wall, especially on the left. Intraoperative cystourethroscopy revealed normal bladder and urethral mucosa without evidence of laceration, foreign body, neoplasm, or stone. Both ureters were effluxing urine at the conclusion of the case. Rectal exam was normal at the conclusion of the case. She was very oozy throguhout the case. Good hemostasis was achieved with pressure. INDICATIONS FOR PROCEDURE: This is a 71-year-old female with a history of worsening symptomatic pelvic organ prolapse who desired definitive surgical treatment after being extensively counseled on the risks, benefits, indications, and alternatives of the procedure. Risks reviewed include bleeding, infection, damage to the bladder, ureters, urethra, bowel, blood vessels, nerves, postoperative urinary retention, postoperative incontinence, pelvic pain, dyspareunia, recurrence, mesh erosion, anesthetic complications, and death. The patient expressed understanding and informed consent was obtained. DESCRIPTION OF PROCEDURE: On the day of surgery, the patient was identified in the preop waiting area. Consents were again reviewed.  The patient was then taken to the operating room where she was placed in a dorsal lithotomy

## 2023-10-23 ENCOUNTER — TELEPHONE (OUTPATIENT)
Dept: UROGYNECOLOGY | Age: 78
End: 2023-10-23

## 2023-10-23 NOTE — TELEPHONE ENCOUNTER
Patient was last seen on 07/26/2026    Pt had surgery on 10/10/10 for   1. Supracervical hysterectomy and bilateral salpingoopherectomy. 2. Robotic-assisted laparoscopic sacral colpopexy with Restorelle Y mesh. 3. Cystourethroscopy. Pt is calling today complaining of \"light red discharge, and a marble size packet between the vagina area and urethral\" . Patient denies fever, chills, nausea, vomiting, chest pain or shortness of breath. Ms. Henderson Hopes state that the duration of the problem is 1 week. Patient denies redness and pain. Please advise.

## 2023-11-09 ENCOUNTER — OFFICE VISIT (OUTPATIENT)
Dept: UROGYNECOLOGY | Age: 78
End: 2023-11-09
Payer: MEDICARE

## 2023-11-09 DIAGNOSIS — K59.09 CHRONIC CONSTIPATION: Primary | ICD-10-CM

## 2023-11-09 DIAGNOSIS — N81.89 WEAKNESS OF PELVIC FLOOR: ICD-10-CM

## 2023-11-09 DIAGNOSIS — N36.8 URETHRAL PROLAPSE: ICD-10-CM

## 2023-11-09 DIAGNOSIS — N81.3 UTEROVAGINAL PROLAPSE, COMPLETE: ICD-10-CM

## 2023-11-09 PROCEDURE — 99213 OFFICE O/P EST LOW 20 MIN: CPT | Performed by: OBSTETRICS & GYNECOLOGY

## 2023-11-09 PROCEDURE — 1090F PRES/ABSN URINE INCON ASSESS: CPT | Performed by: OBSTETRICS & GYNECOLOGY

## 2023-11-09 PROCEDURE — G8420 CALC BMI NORM PARAMETERS: HCPCS | Performed by: OBSTETRICS & GYNECOLOGY

## 2023-11-09 PROCEDURE — 1036F TOBACCO NON-USER: CPT | Performed by: OBSTETRICS & GYNECOLOGY

## 2023-11-09 PROCEDURE — G8484 FLU IMMUNIZE NO ADMIN: HCPCS | Performed by: OBSTETRICS & GYNECOLOGY

## 2023-11-09 PROCEDURE — G8427 DOCREV CUR MEDS BY ELIG CLIN: HCPCS | Performed by: OBSTETRICS & GYNECOLOGY

## 2023-11-09 PROCEDURE — G8400 PT W/DXA NO RESULTS DOC: HCPCS | Performed by: OBSTETRICS & GYNECOLOGY

## 2023-11-09 PROCEDURE — 1123F ACP DISCUSS/DSCN MKR DOCD: CPT | Performed by: OBSTETRICS & GYNECOLOGY

## 2023-11-09 RX ORDER — ESTRADIOL 0.1 MG/G
0.5 CREAM VAGINAL SEE ADMIN INSTRUCTIONS
Qty: 42.5 G | Refills: 3 | Status: SHIPPED | OUTPATIENT
Start: 2023-11-09

## 2023-11-09 NOTE — PROGRESS NOTES
[facility] UROGYNECOLOGY  [address] [facility]  Dept: [phone]        PCP:  Irma Shetty MD    11/9/2023    Chief Complaint   Patient presents with    Post-Op Check     4 week           HPI:  Mehid Amador is here for her postop check. She had a Supracervical hysterectomy and bilateral salpingoopherectomy, Robotic-assisted laparoscopic sacral colpopexy with Restorelle Y mesh & Cystourethroscopy on 10/10/23. She is doing very well today. She denies fevers, chills nausea, vomiting, chest pain, shortness of breath. She is ambulating, tolerating a regular diet, and pain is well controlled. She does not have any vaginal bleeding and is currently back to doing her normal activities of daily living. She does not have any difficulty with urination or bowel movements. She struggles with constipation, she takes a colace w/ laxative every couple days, softener every evening. She has maybe 2-3 Bms a week if she takes the laxative medication. She does not have any signs or symptoms of POP or incontinence recurrence. No results found for this visit on 11/09/23. There were no vitals taken for this visit. Exam  Incisions:  Clean, Dry, and Intact. Healing well. Vulva:    Normal. No lesions  Bartholin's Gland:  Bilateral , Normal, nontender  Skenes Gland:  Bilateral, Normal, nontender   Clitoris:  Normal.   Introitus:    Normal.   Urethral Meatus:  Normal appearing, normal size, no lesions, no prolapse  Urethra:  + urethral prolapse of the posterior wall.   Vagina:  No atrophy, no discharge, no lesions, no evidence of mesh erosion or complication   Adnexa:   No masses palpated, no tenderness  Bladder:  No tenderness, no masses palpated  Perineum:  Normal, no lesions    Rectal   Anorectal Exam: No hemorrhoids and no masses or lesions of the perineum           4/27/2023    10:00 AM   Pelvic Organ Prolapse Quantification   Anterior Wall (Aa) 1   Anterior Wall (Ba) 1   Cervix or

## 2023-11-09 NOTE — ASSESSMENT & PLAN NOTE
We discussed starting vaginal estrogen cream for atrophy. I described the benefits to vaginal health. We briefly discussed well publicized literature on risks of hormone replacement therapy. I described the low systemic absorption of vaginal estrogen. She will start vaginal estrogen. Please use Please use 0.5 g in the vagina every night for 2 weeks and then 3 nights per week. Coupon and sample given.

## 2023-11-09 NOTE — ASSESSMENT & PLAN NOTE
She uses stool softener - twice per day, and be more consistent with BM. The initial treatment of constipation is assuring that there is enough fiber in the diet. A high fiber diet with plenty of fluids (up to 8 glasses of water daily) is suggested to relieve these symptoms. Citrucel, Metamucil, bene fiber, Fibercon etc, are great supplements. The goal is to slowly work up to 25-30g of fiber daily. Osmotic laxatives can be added. I have recommended Miralx 17g daily to prevent straining with BM. Stimulant laxatives such as glycerol suppositories or bisacodyl can help as well. Physical therapy can also help achieve adequate bowel movements. Physical therapy with biofeedback has been shown to improve symptoms up to 70%.      If conservative measures do not work, we can consider sending her for ARM

## 2023-11-09 NOTE — PATIENT INSTRUCTIONS
The initial treatment of constipation is assuring that there is enough fiber in the diet. A high fiber diet with plenty of fluids (up to 8 glasses of water daily) is suggested to relieve these symptoms. Citrucel, Metamucil, bene fiber, Fibercon etc, are great supplements. The goal is to slowly work up to 25-30g of fiber daily. Osmotic laxatives can be added. I have recommended Miralx 17g daily to prevent straining with BM. Stimulant laxatives such as glycerol suppositories or bisacodyl can help as well. We discussed starting vaginal estrogen cream for atrophy. I described the benefits to vaginal health. We briefly discussed well publicized literature on risks of hormone replacement therapy. I described the low systemic absorption of vaginal estrogen. She will start vaginal estrogen. Please use 0.5 g in the vagina every night for 2 weeks and then 3 nights per week.

## 2024-01-03 ENCOUNTER — HOSPITAL ENCOUNTER (OUTPATIENT)
Dept: PHYSICAL THERAPY | Age: 79
Setting detail: RECURRING SERIES
Discharge: HOME | End: 2024-01-06
Payer: MEDICARE

## 2024-01-03 DIAGNOSIS — R27.9 UNSPECIFIED LACK OF COORDINATION: Primary | ICD-10-CM

## 2024-01-03 DIAGNOSIS — M62.81 MUSCLE WEAKNESS (GENERALIZED): ICD-10-CM

## 2024-01-03 PROCEDURE — 97530 THERAPEUTIC ACTIVITIES: CPT

## 2024-01-03 PROCEDURE — 97162 PT EVAL MOD COMPLEX 30 MIN: CPT

## 2024-01-03 NOTE — PROGRESS NOTES
Kady Wilhelm S Rolon  : 1945  Primary: [facility] Medicare Advantage (Medicare Managed)  [facility]:  [facility] [address]  [facility] [address]  Phone: [phone]  Fax: [phone] Plan Frequency: 1x/week    Plan of Care/Certification Expiration Date: 24        Plan of Care/Certification Expiration Date:  Plan of Care/Certification Expiration Date: 24    Frequency/Duration:   Plan Frequency: 1x/week      Time In/Out:   Time In: 0  Time Out: 0310      PT Visit Info:    Plan Frequency: 1x/week      Visit Count:  1    OUTPATIENT PHYSICAL THERAPY:   Treatment Note 1/3/2024       Episode  (PFPT)               Treatment Diagnosis:    Unspecified lack of coordination  Muscle weakness (generalized)  Medical/Referring Diagnosis:    Chronic constipation  Weakness of pelvic floor      Referring Physician:  Nette Cook DO MD Orders:  PT Eval and Treat   Return MD Appt:    Date of Onset:  Onset Date: 10/10/23     Allergies:   Naproxen and Aleve-d sinus & cold [pseudoephedrine-naproxen na er]  Restrictions/Precautions:   None      Interventions Planned (Treatment may consist of any combination of the following):     See Assessment Note    Subjective Comments:   See IE 1/3/24  Initial Pain Level::     0 /10  Post Session Pain Level:       0 /10  Medications Last Reviewed:  1/3/2024  Updated Objective Findings:  See Evaluation Note from today  Treatment   THERAPEUTIC ACTIVITY: (25 minutes): Functional activity education regarding anatomy, pathology and role of pelvic floor muscle (PFM) function in relation to presenting symptoms and role of pelvic floor therapy in conservative treatment. and Extensive functional activity training on defecation dynamics with emphasis on positioning/posture and use of coordinated exhalation/expanded abdomen, breathing, coordination of PFM bulge and external anal sphincter relaxation in order to improve bowel/bladder

## 2024-01-03 NOTE — THERAPY EVALUATION
Kady Melonie Rolon  : 1945  Primary: [facility] Medicare Advantage (Medicare Managed)  Secondary:  [facility] @ [address] RICARDO Jacey  [facility] [address]  Phone: [phone]  Fax: [phone] Plan Frequency: 1x/week    Plan of Care/Certification Expiration Date: 24        Plan of Care/Certification Expiration Date:  Plan of Care/Certification Expiration Date: 24    Frequency/Duration: Plan Frequency: 1x/week      Time In/Out:   Time In: 0  Time Out: 0    PT Visit Info:    Plan Frequency: 1x/week      Visit Count:  1                OUTPATIENT PHYSICAL THERAPY:             Initial Assessment 1/3/2024                 Episode (PFPT)         Treatment Diagnosis:    Unspecified lack of coordination  Muscle weakness (generalized)    Medical/Referring Diagnosis:    Chronic constipation  Weakness of pelvic floor    Referring Physician:  Nette Cook DO MD Orders:  PT Eval and Treat   Return MD Appt:    Date of Onset:  Onset Date: 10/10/23   post-operative 10/10/23  Allergies:  Naproxen and Aleve-d sinus & cold [pseudoephedrine-naproxen na er]  Restrictions/Precautions:    None      Medications Last Reviewed:  1/3/2024     SUBJECTIVE   History of Injury/Illness (Reason for Referral):  Ms. Rolon is a 79 yo female referred to pelvic floor physical therapy (PFPT) by Nette Cook DO 2/2 Other constipation [K59.09]  Weakness of pelvic floor [N81.89]. Pt underwent a supracervical hysterectomy and bilateral salpingoopherectomy, Robotic-assisted laparoscopic sacral colpopexy with Restorelle Y mesh & Cystourethroscopy on 10/10/23.     Pt describes lifelong history of constipation with straining to evacuate. State she is having BM's daily with taking Miralax, but at times continues to strain to evacuate.   Pt reports abdominal discomfort with constipation.     Pt denies pelvic pressure currently.    Previous medical management includes surgery (see

## 2024-01-10 ENCOUNTER — HOSPITAL ENCOUNTER (OUTPATIENT)
Dept: PHYSICAL THERAPY | Age: 79
Setting detail: RECURRING SERIES
Discharge: HOME | End: 2024-01-13
Payer: MEDICARE

## 2024-01-10 PROCEDURE — 97110 THERAPEUTIC EXERCISES: CPT

## 2024-01-10 PROCEDURE — 97530 THERAPEUTIC ACTIVITIES: CPT

## 2024-01-10 NOTE — PROGRESS NOTES
Kady Guerreroremy WINTERS Rolon  : 1945  Primary: [facility] Medicare Complete (Medicare Managed)  Secondary:  [facility] @ [address] RICARDO Jacey  [facility] [address]  Phone: [phone]  Fax: [phone] Plan Frequency: 1x/week    Plan of Care/Certification Expiration Date: 24        Plan of Care/Certification Expiration Date:  Plan of Care/Certification Expiration Date: 24    Frequency/Duration:   Plan Frequency: 1x/week      Time In/Out:   Time In: 0207  Time Out: 0258      PT Visit Info:    Plan Frequency: 1x/week      Visit Count:  2    OUTPATIENT PHYSICAL THERAPY:   Treatment Note 1/10/2024       Episode  (PFPT)               Treatment Diagnosis:    Unspecified lack of coordination  Muscle weakness (generalized)     Medical/Referring Diagnosis:    Chronic constipation  Weakness of pelvic floor      Referring Physician:  Nette Cook DO MD Orders:  PT Eval and Treat   Return MD Appt:    Date of Onset:  Onset Date: 10/10/23     Allergies:   Naproxen and Aleve-d sinus & cold [pseudoephedrine-naproxen na er]  Restrictions/Precautions:   None      Interventions Planned (Treatment may consist of any combination of the following):     See Assessment Note    Subjective Comments:   1/10/24: \"When I practice what you taught me, I am able to have a bowel movement easier.  I had one day () where I was more bloated. I was eventually able to have a BM and this felt better\"  Denies urinary problems. Denies pelvic pressure.     Initial Pain Level::     0 /10  Post Session Pain Level:       0 /10  Medications Last Reviewed:  1/10/2024  Updated Objective Findings:    sEMG biofeedback:  Resting PFM activity <3.0 mV in supine. <2.0 mV in sitting. With coordinated push efforts PFM activity remained at baseline.     Treatment   THERAPEUTIC ACTIVITY: (30 minutes): Functional activity education regarding anatomy, pathology and role of pelvic floor muscle (PFM) function in

## 2024-01-24 ENCOUNTER — HOSPITAL ENCOUNTER (OUTPATIENT)
Dept: PHYSICAL THERAPY | Age: 79
Setting detail: RECURRING SERIES
Discharge: HOME | End: 2024-01-27
Payer: MEDICARE

## 2024-01-24 PROCEDURE — 97530 THERAPEUTIC ACTIVITIES: CPT

## 2024-01-24 PROCEDURE — 97140 MANUAL THERAPY 1/> REGIONS: CPT

## 2024-01-24 PROCEDURE — 97110 THERAPEUTIC EXERCISES: CPT

## 2024-01-24 NOTE — THERAPY DISCHARGE
10/10/23.     Pt describes lifelong history of constipation with straining to evacuate. State she is having BM's daily with taking Miralax, but at times continues to strain to evacuate.   Pt reports abdominal discomfort with constipation.     Pt denies pelvic pressure currently.    Previous medical management includes surgery (see above)  Current medical management includes Miralax    Urinary: Frequency 5-8 x/day, 0-1 x/night.  Positive for stress urinary incontinence. UUI is very rare (only with full bladder).   Negative for incomplete emptying, urinary hesitancy/intermittency, and dysuria.   Pt does use  liners  for urinary protection when out in pubic.           Fluid intake: 4-5 8 oz water/day; bladder irritants include: Coffee for breakfast, Raven tea (1/day), two glasses of tea at night). Pt does not limit fluid intake due to bladder control.    Bowel:   Prior to surgery. BM's occurred daily. Today she is having a BM every day to every other day.   Positive for pushing/straining with bowel movement and constipation.  Incomplete bowel evacuation.   Negative for fecal incontinence.   Ward stool type: 2, 3, and 4. Use of stool softeners or laxatives? Yes - Miralax daily (1 cap to 1/2 cap)  Diet- high fiber cereal and fruits/vegetables     Sexual: Pt is  sexually active with male partners. positive for dyspareunia. Negative for pain with sexual intercourse History of sexual abuse: No    Current exercise routine: Pt enjoys walking. She used to walk daily, but has been less consistent with this due to her husbands health.     LBP in the morning which improves with activity during the day.     Patient Stated Goal(s):  \"Improve bowel evacuation, reduce symptoms of prolapse\"    Initial Pain Level:     0 /10   Post Session Pain Level:    0  /10    Past Medical History/Comorbidities:   Ms. Rolon  has a past medical history of History of pulmonary embolus (PE) and Infertility, female.  Ms. Rolon  has a past surgical

## 2024-01-24 NOTE — PROGRESS NOTES
Kadyagnes Rolon  : 1945  Primary: [facility] Medicare Complete (Medicare Managed)  Secondary:  [facility] @ [address] RICARDO Jacey  [facility] [address]  Phone: [phone]  Fax: [phone] Plan Frequency: 1x/week    Plan of Care/Certification Expiration Date: 24        Plan of Care/Certification Expiration Date:  Plan of Care/Certification Expiration Date: 24    Frequency/Duration:   Plan Frequency: 1x/week      Time In/Out:   Time In: 0205  Time Out: 0300      PT Visit Info:    Plan Frequency: 1x/week      Visit Count:  3    OUTPATIENT PHYSICAL THERAPY:   Treatment Note 2024       Episode  (PFPT)               Treatment Diagnosis:    Unspecified lack of coordination  Muscle weakness (generalized)     Medical/Referring Diagnosis:    Chronic constipation  Weakness of pelvic floor      Referring Physician:  Nette Cook DO MD Orders:  PT Eval and Treat   Return MD Appt:  Pt will f/u with Dr. Cook on   Date of Onset:  Onset Date: 10/10/23     Allergies:   Naproxen and Aleve-d sinus & cold [pseudoephedrine-naproxen na er]  Restrictions/Precautions:   None      Interventions Planned (Treatment may consist of any combination of the following):     See Assessment Note    Subjective Comments:         24: Pt driving 44 miles to physical therapy.         Constipation: Pt has been able to wean dosage daily of Miralax. States she is able to evacuate with greater ease using \"fog up a mirror\" strategy.         Denies straining during bowel evacuation.         Denies urinary concerns.        Denies pelvic heaviness/pressure.         + for abdominal bloating - 1x/week.     1/10/24: \"When I practice what you taught me, I am able to have a bowel movement easier.  I had one day () where I was more bloated. I was eventually able to have a BM and this felt better\"  Denies urinary problems. Denies pelvic pressure.     Initial Pain Level::     0 /10  Post

## 2024-02-07 ENCOUNTER — OFFICE VISIT (OUTPATIENT)
Dept: UROGYNECOLOGY | Age: 79
End: 2024-02-07

## 2024-02-07 DIAGNOSIS — N81.3 UTEROVAGINAL PROLAPSE, COMPLETE: Primary | ICD-10-CM

## 2024-02-07 DIAGNOSIS — K59.09 CHRONIC CONSTIPATION: ICD-10-CM

## 2024-02-07 NOTE — ASSESSMENT & PLAN NOTE
You are now 4 months postop:  You no longer have lifting, or bathing restrictions. You should be back to your normal activities of daily living.       Cont with HEP.     I am releasing you back to your primary care provider. Should your symptoms recur, or new symptoms arise, please do not hesitate to call our office for an appointment.

## 2024-02-07 NOTE — PROGRESS NOTES
[facility] UR[facility]YNECOLOG[facility]  [address]  [address]  [facility] [address]  Dept: [phone]        PCP:  Yousuf Martinez MD    2/7/2024    Chief Complaint   Patient presents with    Post-Op Check     4 month           HPI:  Kady Rolon is here for her postop check. She had a Supracervical hysterectomy and bilateral salpingoopherectomy, Robotic-assisted laparoscopic sacral colpopexy with Restorelle Y mesh & Cystourethroscopy on 10/10/23. She is doing very well today. She denies fevers, chills nausea, vomiting, chest pain, shortness of breath. She is ambulating, tolerating a regular diet, and pain is well controlled. She does not have any vaginal bleeding and is currently back to doing her normal activities of daily living.  She does not have any difficulty with urination or bowel movements.     She still struggles with gas. She went to PT and is doing her home exercises. She takes miralex 1/2 dose most nights.     She does not have any signs or symptoms of POP or incontinence recurrence.    No results found for this visit on 02/07/24.    There were no vitals taken for this visit.    Exam: This includes at least 4 minutes of clinical staff time associated with chaperoning a pelvic exam.  Incisions:  Clean, Dry, and Intact. Healing well.   Vulva:    Normal. No lesions  Bartholin's Gland:  Bilateral , Normal, nontender  Skenes Gland:  Bilateral, Normal, nontender   Clitoris:  Normal.   Introitus:    Normal.   Urethral Meatus:  Normal appearing, normal size, no lesions, no prolapse  Urethra:  No masses, no tenderness  Vagina:  No atrophy, no discharge, no lesions  Cervix:  No lesions, no discharge   Adnexa:   No masses palpated, no tenderness  Bladder:  No tenderness, no masses palpated  Perineum:  Normal, no lesions    Rectal   Anorectal Exam: No hemorrhoids and no masses or lesions of the perineum           2/7/2024     1:00 PM 4/27/2023    10:00 AM   Pelvic Organ Prolapse Quantification   Anterior Wall

## (undated) DEVICE — GAUZE,SPONGE,2"X2",8PLY,STERILE,LF,2'S: Brand: MEDLINE

## (undated) DEVICE — SUTURE PDS II SZ 0 L27IN ABSRB VLT L26MM CT-2 1/2 CIR Z334H

## (undated) DEVICE — SUTURE PROL SZ 0 L30IN NONABSORBABLE BLU L26MM CT-2 1/2 CIR 8412H

## (undated) DEVICE — TISSUE RETRIEVAL SYSTEM: Brand: INZII RETRIEVAL SYSTEM

## (undated) DEVICE — AIRSEAL 8 MM ACCESS PORT AND LOW PROFILE OBTURATOR WITH BLADELESS OPTICAL TIP, 120 MM LENGTH: Brand: AIRSEAL

## (undated) DEVICE — GARMENT,MEDLINE,DVT,INT,CALF,LG, GEN2: Brand: MEDLINE

## (undated) DEVICE — Device

## (undated) DEVICE — COLUMN DRAPE

## (undated) DEVICE — 1LYRTR 16FR10ML 100%SILI SNAP: Brand: MEDLINE INDUSTRIES, INC.

## (undated) DEVICE — ROBOTIC DRAPE WITH LEGGINGS: Brand: CONVERTORS

## (undated) DEVICE — GLOVE SURG SZ 6 L12IN FNGR THK79MIL GRN LTX FREE

## (undated) DEVICE — 1840 FOAM BLOCK NEEDLE COUNTER: Brand: DEVON

## (undated) DEVICE — CATHETER URETH 16FR BLLN 5CC SIL ALLY W/ SIL HYDRGEL 2 W F

## (undated) DEVICE — SOLUTION IV 1000ML LAC RINGERS PH 6.5 INJ USP VIAFLX PLAS

## (undated) DEVICE — COVER,MAYO STAND,STERILE: Brand: MEDLINE

## (undated) DEVICE — BLADELESS OBTURATOR: Brand: WECK VISTA

## (undated) DEVICE — SOLUTION IRRIGATION H2O 0797305] ICU MEDICAL INC]

## (undated) DEVICE — GLOVE SURG SZ 6 THK91MIL LTX FREE SYN POLYISOPRENE ANTI

## (undated) DEVICE — CYSTO/BLADDER IRRIGATION SET, REGULATING CLAMP

## (undated) DEVICE — SUTURE MCRYL SZ 2-0 L27IN ABSRB VLT CT-2 L26MM 1/2 CIR Y333H

## (undated) DEVICE — SEAL

## (undated) DEVICE — ARM DRAPE

## (undated) DEVICE — PAD PT POS 36 IN SURGYPAD DISP

## (undated) DEVICE — ROBOTIC HYSTERECTOMY: Brand: MEDLINE INDUSTRIES, INC.

## (undated) DEVICE — 3M™ TEGADERM™ TRANSPARENT FILM DRESSING FRAME STYLE, 1624W, 2-3/8 IN X 2-3/4 IN (6 CM X 7 CM), 100/CT 4CT/CASE: Brand: 3M™ TEGADERM™

## (undated) DEVICE — CANISTER, RIGID, 2000CC: Brand: MEDLINE INDUSTRIES, INC.

## (undated) DEVICE — SUTURE VCRL SZ 0 L27IN ABSRB UD L36MM CT-1 1/2 CIR J260H

## (undated) DEVICE — SUTURE MCRYL SZ 4-0 L27IN ABSRB UD L19MM PS-2 1/2 CIR PRIM Y426H

## (undated) DEVICE — TIP COVER ACCESSORY